# Patient Record
Sex: FEMALE | Race: OTHER
[De-identification: names, ages, dates, MRNs, and addresses within clinical notes are randomized per-mention and may not be internally consistent; named-entity substitution may affect disease eponyms.]

---

## 2022-09-23 ENCOUNTER — HOSPITAL ENCOUNTER (EMERGENCY)
Dept: HOSPITAL 26 - MED | Age: 32
LOS: 1 days | Discharge: HOME | End: 2022-09-24
Payer: MEDICAID

## 2022-09-23 VITALS — WEIGHT: 111.25 LBS | BODY MASS INDEX: 24 KG/M2 | HEIGHT: 57 IN

## 2022-09-23 VITALS — DIASTOLIC BLOOD PRESSURE: 71 MMHG | SYSTOLIC BLOOD PRESSURE: 132 MMHG
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
## 2022-09-23 DIAGNOSIS — S02.5XXA: Primary | ICD-10-CM
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
## 2022-09-23 DIAGNOSIS — Z79.899: ICD-10-CM
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
## 2022-09-23 PROCEDURE — 99283 EMERGENCY DEPT VISIT LOW MDM: CPT

## 2022-09-24 VITALS — DIASTOLIC BLOOD PRESSURE: 71 MMHG | SYSTOLIC BLOOD PRESSURE: 132 MMHG

## 2022-09-24 NOTE — NUR
LEFT SIDED FACIAL NUMBNESS X 1HR RAD TO LEFT ARM. REPORTS MOLAR PAIN ON LEFT 
UPPER SIDE. CAITLIN MIKE MADE AWARE, MD TO SEE PT. NO FACIAL DROOP, NO 
EXTREMITY ASYMMETRY. PT IS SPEAKING IN CLEAR SENTENCES



DENIES HX, RX AND ALLERGIES